# Patient Record
Sex: FEMALE | Race: WHITE | NOT HISPANIC OR LATINO
[De-identification: names, ages, dates, MRNs, and addresses within clinical notes are randomized per-mention and may not be internally consistent; named-entity substitution may affect disease eponyms.]

---

## 2023-06-30 PROBLEM — Z00.00 ENCOUNTER FOR PREVENTIVE HEALTH EXAMINATION: Status: ACTIVE | Noted: 2023-06-30

## 2023-07-06 ENCOUNTER — APPOINTMENT (OUTPATIENT)
Dept: ORTHOPEDIC SURGERY | Facility: CLINIC | Age: 58
End: 2023-07-06
Payer: COMMERCIAL

## 2023-07-06 VITALS — BODY MASS INDEX: 23.39 KG/M2 | WEIGHT: 137 LBS | HEIGHT: 64 IN

## 2023-07-06 DIAGNOSIS — Z78.9 OTHER SPECIFIED HEALTH STATUS: ICD-10-CM

## 2023-07-06 DIAGNOSIS — M19.90 UNSPECIFIED OSTEOARTHRITIS, UNSPECIFIED SITE: ICD-10-CM

## 2023-07-06 PROCEDURE — 72040 X-RAY EXAM NECK SPINE 2-3 VW: CPT

## 2023-07-06 PROCEDURE — 99204 OFFICE O/P NEW MOD 45 MIN: CPT

## 2023-07-06 PROCEDURE — 73030 X-RAY EXAM OF SHOULDER: CPT | Mod: RT

## 2023-07-06 PROCEDURE — 73010 X-RAY EXAM OF SHOULDER BLADE: CPT | Mod: RT

## 2023-07-07 NOTE — HISTORY OF PRESENT ILLNESS
[de-identified] : The patient is a 57 year  old right hand dominant female who presents today complaining of right shoulder pain.  \par Date of Injury/Onset: 6/1/23\par Pain:    At Rest: 4/10 \par With Activity:  10/10 \par Mechanism of injury: gradual onset, no HEATHER\par This is not a Work Related Injury being treated under Worker's Compensation.\par This is not an athletic injury occurring associated with an interscholastic or organized sports team.\par Quality of symptoms: tingling into right hand, dull throbbing, shooting pain from hands to shoulder, weakness\par Improves with: rest, icy hot\par Worse with: lifting, shoulder motions\par Prior treatment: none\par Prior Imaging: none\par Out of work/sport: currently working\par School/Sport/Position/Occupation: school counselor \par Additional Information: None\par

## 2023-07-07 NOTE — IMAGING
[Right] : right shoulder [There are no fractures, subluxations or dislocations. No significant abnormalities are seen] : There are no fractures, subluxations or dislocations. No significant abnormalities are seen [de-identified] : The patient is a well appearing 57 year old female of their stated age. \par Neck is supple & nontender to palpation. EQUIVOCAL Spurling's test. \par \par Right Shoulder: \par \par ROM: \par Forward Flexion: 180 degrees \par Abduction: 180 degrees \par ER at 90: 90 degrees \par IR at 90: 45 degrees \par ER at N: 50 degrees \par Motor: \par Abduction: 4- out of 5 \par FPS: 4- out of 5 \par Flexion: 4- out of 5 \par Internal Rotation: 4- out of 5 \par External Rotation: 4- out of 5 \par Provocative Testing: \par Impingement: Negative \par [place]'s: Negative \par Other: TENDER MEDIAL SCAPULA BORDER \par \par -----------------------------------\par Effected Elbow: RIGHT \par ROM: \par Flexion: 0-145 degrees \par Supination: 90 degrees \par Pronation: 90 degrees \par \par Inspection:\par Erythema: None\par Ecchymosis: None\par Abrasions: None\par Effusion: None\par Deformity: None\par \par Palpation:\par Crepitus: None\par Medial Epicondyle/Flexor-Pronator: Nontender\par Lateral Epicondyle/ECRB: TENDER \par Olecranon: Nontender\par Radial Head: Nontender\par Humeral Head: Nontender \par Distal Biceps: Nontender\par Distal Triceps: Nontender\par Flexor-Pronator: Nontender\par Extensor/ECRB: Nontender\par UCL: Nontender\par Pronator Intersection: Nontender\par Ulnar Nerve:  Stable & Nontender\par \par Stress Testing:\par Varus at 0 Degrees: Stable\par Varus at 30 Degrees: Stable\par Valgus at 0 Degrees: Stable\par Valgus at 30 Degrees: Stable\par \par Motor:\par Elbow Flexion: 5 out of 5\par Elbow Extension: 5 out of 5\par Supination: 5 out of 5\par Pronation: 5 out of 5\par Wrist Flexion: 5 out of 5\par Wrist Extension: 5 out of 5\par Interossei: 5 out of 5\par : 5 out of 5\par \par Provocative Testing:\par Milking: Negative\par Moving Valgus Stress: Negative\par Posterolateral R-I: Negative\par Hook Test: Negative\par Resisted Wrist Extension: WITH PAIN\par Resisted Index Finger Extension: WITH PAIN\par Resisted Middle Finger Extension: WITH PAIN \par Resisted Wrist Flexion: No Pain\par Resisted Pronation: No Pain\par \par Neurologic Exam:\par Axillary Nerve:  SLT\par Radial Nerve: SLT\par Median Nerve: SLT\par Ulnar Nerve:  SLT\par Other:  N/A\par Vascular Exam:\par Radial Pulse: 2+\par Ulnar Pulse: 2+\par Capillary Refill: <2 Seconds\par Other Exams: None\par Pertinent Contralateral Elbow Findings: None\par \par Assessment: The patient is a 57 year old woman with right elbow pain and paresthesia down RUE and radiographic and physical exam findings consistent with lateral epicondylitis and possible HNP.    \par The patient’s condition is acute\par Documents/Results Reviewed Today: X-Ray cervical spine, X-Ray right shoulder/scapula \par Tests/Studies Independently Interpreted Today: X-Ray cervical spine reveals evidence of straightening of normal cervical lordosis. X-Ray right shoulder/scapula is unremarkable. \par Pertinent findings include: Equivocal spurling producing radicular symptoms down RUE, 4-/5 strength throughout, -impingement, tender lateral epicondyle, pain with resisted wrist index and middle finger extension \par Confounding medical conditions/concerns: None\par \par Plan: Due to worsening weakness with associated radicular symptoms, the patient will obtain MRI cervical spine to evaluate for possible HNP. Start physical therapy, HEP, and stretching for lateral epicondylitis. Recommended the patient obtain a Reparel elbow sleeve to aid in inflammation. The patient will begin use of counter force strap for all activity - prescribed today. Discussed proper activity modifications to avoid aggravation of lateral epicondylitis. Modify activity as discussed. \par Tests Ordered: MRI cervical spine \par Prescription Medications Ordered: Discussed appropriate use of OTC anti-inflammatories and analgesics (including but not limited to Aleve, Advil, Tylenol, Motrin, Ibuprofen, Voltaren gel, etc.) \par Braces/DME Ordered: None\par Activity/Work/Sports Status: \par Additional Instructions: None\par Follow-Up: After MRI \par \par The patient's current medication management of their orthopedic diagnosis was reviewed today:\par (1) We discussed a comprehensive treatment plan that included possible pharmaceutical management involving the use of prescription strength medications including but not limited to options such as oral Naprosyn 500mg BID, once daily Meloxicam 15 mg, or 500-650 mg Tylenol versus over the counter oral medications and topical prescription NSAID Pennsaid vs over the counter Voltaren gel.  Based on our extensive discussion, the patient declined prescription medication and will use over the counter Advil, Alleve, Voltaren Gel or Tylenol as directed.\par (2) There is a moderate risk of morbidity with further treatment, especially from use of prescription strength medications and possible side effects of these medications which include upset stomach with oral medications, skin reactions to topical medications and cardiac/renal issues with long term use.\par (3) I recommended that the patient follow-up with their medical physician to discuss any significant specific potential issues with long term medication use such as interactions with current medications or with exacerbation of underlying medical comorbidities.\par (4) The benefits and risks associated with use of injectable, oral or topical, prescription and over the counter anti-inflammatory medications were discussed with the patient. The patient voiced understanding of the risks including but not limited to bleeding, stroke, kidney dysfunction, heart disease, and were referred to the black box warning label for further information. \par \par Kelley FINE attest that this documentation has been prepared under the direction and in the presence of Provider Dr. Ramses Crawford. \par \par The documentation recorded by the scribe accurately reflects the services IDr. Ramses, personally performed and the decisions made by me.\par \par   [FreeTextEntry1] : X-Ray right shoulder/scapula is unremarkable.

## 2023-07-13 ENCOUNTER — RESULT REVIEW (OUTPATIENT)
Age: 58
End: 2023-07-13

## 2023-07-20 ENCOUNTER — APPOINTMENT (OUTPATIENT)
Dept: ORTHOPEDIC SURGERY | Facility: CLINIC | Age: 58
End: 2023-07-20
Payer: COMMERCIAL

## 2023-07-20 VITALS — HEIGHT: 64 IN | WEIGHT: 137 LBS | BODY MASS INDEX: 23.39 KG/M2

## 2023-07-20 DIAGNOSIS — M25.511 PAIN IN RIGHT SHOULDER: ICD-10-CM

## 2023-07-20 DIAGNOSIS — M77.11 LATERAL EPICONDYLITIS, RIGHT ELBOW: ICD-10-CM

## 2023-07-20 PROCEDURE — 99214 OFFICE O/P EST MOD 30 MIN: CPT

## 2023-07-21 NOTE — DATA REVIEWED
[MRI] : MRI [Cervical Spine] : cervical spine [Report was reviewed and noted in the chart] : The report was reviewed and noted in the chart [I independently reviewed and interpreted images and report] : I independently reviewed and interpreted images and report [I reviewed the films/CD and additionally noted] : I reviewed the films/CD and additionally noted [FreeTextEntry1] : multilevel degenerative changes throughout the cervical spine, central disc herniation at C3-C4, spinal stenosis at C5-6, mild spinal stenosis at C6-C7, straightening of normal lordotic curve

## 2023-07-21 NOTE — IMAGING
[de-identified] : The patient is a well appearing 57 year old female of their stated age. \par Neck is supple & nontender to palpation. EQUIVOCAL Spurling's test. \par \par Right Shoulder: \par \par ROM: \par Forward Flexion: 180 degrees \par Abduction: 180 degrees \par ER at 90: 90 degrees \par IR at 90: 45 degrees \par ER at N: 50 degrees \par Motor: \par Abduction: 4- out of 5 \par FPS: 4- out of 5 \par Flexion: 4- out of 5 \par Internal Rotation: 4- out of 5 \par External Rotation: 4- out of 5 \par Provocative Testing: \par Impingement: Negative \par [place]'s: Negative \par Other: TENDER MEDIAL SCAPULA BORDER \par \par -----------------------------------\par Effected Elbow: RIGHT \par ROM: \par Flexion: 0-145 degrees \par Supination: 90 degrees \par Pronation: 90 degrees \par \par Inspection:\par Erythema: None\par Ecchymosis: None\par Abrasions: None\par Effusion: None\par Deformity: None\par \par Palpation:\par Crepitus: None\par Medial Epicondyle/Flexor-Pronator: Nontender\par Lateral Epicondyle/ECRB: TENDER \par Olecranon: Nontender\par Radial Head: Nontender\par Humeral Head: Nontender \par Distal Biceps: Nontender\par Distal Triceps: Nontender\par Flexor-Pronator: Nontender\par Extensor/ECRB: Nontender\par UCL: Nontender\par Pronator Intersection: Nontender\par Ulnar Nerve:  Stable & Nontender\par \par Stress Testing:\par Varus at 0 Degrees: Stable\par Varus at 30 Degrees: Stable\par Valgus at 0 Degrees: Stable\par Valgus at 30 Degrees: Stable\par \par Motor:\par Elbow Flexion: 5 out of 5\par Elbow Extension: 5 out of 5\par Supination: 5 out of 5\par Pronation: 5 out of 5\par Wrist Flexion: 5 out of 5\par Wrist Extension: 5 out of 5\par Interossei: 5 out of 5\par : 5 out of 5\par \par Provocative Testing:\par Milking: Negative\par Moving Valgus Stress: Negative\par Posterolateral R-I: Negative\par Hook Test: Negative\par Resisted Wrist Extension: WITH PAIN\par Resisted Index Finger Extension: WITH PAIN\par Resisted Middle Finger Extension: WITH PAIN \par Resisted Wrist Flexion: No Pain\par Resisted Pronation: No Pain\par \par Neurologic Exam:\par Axillary Nerve:  SLT\par Radial Nerve: SLT\par Median Nerve: SLT\par Ulnar Nerve:  SLT\par Other:  N/A\par Vascular Exam:\par Radial Pulse: 2+\par Ulnar Pulse: 2+\par Capillary Refill: <2 Seconds\par Other Exams: None\par Pertinent Contralateral Elbow Findings: None\par \par Assessment: The patient is a 57 year old woman with right elbow pain and paresthesia down RUE and radiographic and physical exam findings consistent with lateral epicondylitis and disc herniation.    \par The patient’s condition is acute\par Documents/Results Reviewed Today: MRI cervical spine \par Tests/Studies Independently Interpreted Today: MRI cervical spine reveals multilevel degenerative changes throughout the cervical spine, central disc herniation at C3-C4, spinal stenosis at C5-6, mild spinal stenosis at C6-C7, straightening of normal lordotic curve\par Pertinent findings include: Equivocal spurling producing radicular symptoms down RUE, 4-/5 strength throughout, -impingement, tender lateral epicondyle, pain with resisted wrist index and middle finger extension \par Confounding medical conditions/concerns: None\par \par Plan: Patient will start physical therapy, HEP, and stretching. Prescribed a Medrol Dose Pack to reduce pain and inflammation - use as directed. Referred patient to pain management specialist, Dr. Gan, for further evaluation and treatment. Modify activity as discussed. \par Tests Ordered: None\par Prescription Medications Ordered: MDP\par Braces/DME Ordered: None\par Activity/Work/Sports Status: activity modifications\par Additional Instructions: None\par Follow-Up: PRN\par \par The patient's current medication management of their orthopedic diagnosis was reviewed today:\par (1) We discussed a comprehensive treatment plan that included possible pharmaceutical management involving the use of prescription strength medications including but not limited to options such as oral Naprosyn 500mg BID, once daily Meloxicam 15 mg, or 500-650 mg Tylenol versus over the counter oral medications and topical prescription NSAID Pennsaid vs over the counter Voltaren gel.  Based on our extensive discussion, the patient was prescribed a Medrol Dose Pack to be taken as directed for the next five days.  Following which OTC NSAIDs may be used PRN for pain, inflammation, and discomfort.  No NSAID medications should be taken concurrently with the Medrol Dose Pack.\par (2) There is a moderate risk of morbidity with further treatment, especially from use of prescription strength medications and possible side effects of these medications which include upset stomach with oral medications, skin reactions to topical medications and cardiac/renal issues with long term use.\par (3) I recommended that the patient follow-up with their medical physician to discuss any significant specific potential issues with long term medication use such as interactions with current medications or with exacerbation of underlying medical comorbidities.\par (4) The benefits and risks associated with use of injectable, oral or topical, prescription and over the counter anti-inflammatory medications were discussed with the patient. The patient voiced understanding of the risks including but not limited to bleeding, stroke, kidney dysfunction, heart disease, and were referred to the black box warning label for further information.\par \par \par I, Tyesha Escalona, attest that this documentation has been prepared under the direction and in the presence of Provider Dr. Ramses Crawford.\par \par \par  The documentation recorded by the scribe accurately reflects the services I, Dr. Ramses Crawford, personally performed and the decisions made by me.\par

## 2023-07-21 NOTE — HISTORY OF PRESENT ILLNESS
[de-identified] : The patient is a 57 year  old right hand dominant female who presents today complaining of right shoulder pain.  \par Date of Injury/Onset: 6/1/23\par Pain:    At Rest: 4/10 \par With Activity:  10/10 \par Mechanism of injury: gradual onset, no HEATHER\par This is not a Work Related Injury being treated under Worker's Compensation.\par This is not an athletic injury occurring associated with an interscholastic or organized sports team.\par Quality of symptoms: tingling into right hand, dull throbbing, shooting pain from hands to shoulder, weakness\par Improves with: rest, icy hot\par Worse with: lifting, shoulder motions\par Treatment/Imaging/Studies Since Last Visit: MRI \par 	Reports Available For Review Today: MRI report\par Out of work/sport: currently working\par School/Sport/Position/Occupation: school counselor \par Changes since last visit: patient reports no change in pain/symptoms since last visit. Here to review MRI results\par Additional Information: None\par

## 2023-08-21 ENCOUNTER — APPOINTMENT (OUTPATIENT)
Dept: PAIN MANAGEMENT | Facility: CLINIC | Age: 58
End: 2023-08-21
Payer: COMMERCIAL

## 2023-08-21 VITALS — BODY MASS INDEX: 23.39 KG/M2 | HEIGHT: 64 IN | WEIGHT: 137 LBS

## 2023-08-21 PROCEDURE — 99204 OFFICE O/P NEW MOD 45 MIN: CPT

## 2023-08-21 RX ORDER — CELECOXIB 200 MG/1
200 CAPSULE ORAL
Qty: 30 | Refills: 1 | Status: ACTIVE | COMMUNITY
Start: 2023-08-21 | End: 1900-01-01

## 2023-08-21 NOTE — ASSESSMENT
[FreeTextEntry1] : A discussion regarding available pain management treatment options occurred with the patient.  These included interventional, rehabilitative, pharmacological, and alternative modalities. We will proceed with the following:    Interventional treatment options:   - proceed with C7-T1 MARTÍN  with fluoroscopic guidance - see additional instructions below    Rehabilitative options:   - initiate trial of physical therapy  - participation in active HEP was discussed    Medication based treatment options: - failed MDP - initiate trial of Celebrex 200 mg daily x 7-10 days then on as needed basis   - continue topical OTC analgesics on as-needed basis - see additional instructions below    Complementary treatment options:   - lifestyle modifications discussed      Additional treatment recommendations as follows:   - Follow up 1-2 weeks post injection for assessment of efficacy and further treatment recommendations  The risks, benefits and alternatives of the proposed procedure were explained in detail with the patient. The risks outlined include but are not limited to infection, bleeding, post- dural puncture headache, nerve injury, a temporary increase in pain, failure to resolve symptoms, allergic reaction, and possible elevation of blood sugar in diabetics if using corticosteroid.  All questions were answered to patient's apparent satisfaction and he/she verbalized an understanding.  We have discussed the risks, benefits, and alternatives NSAID therapy including but not limited to the risk of bleeding, thrombosis, gastric mucosal irritation/ulceration, allergic reaction and kidney dysfunction; the patient verbalizes an understanding.  The documentation recorded by the scribe, in my presence, accurately reflects the service I personally performed and the decisions made by me with my edits as appropriate.   I, Charles Omalley acting as scribe, attest that this documentation has been prepared under the direction and in the presence of Provider Jona Gan DO.

## 2023-08-21 NOTE — REASON FOR VISIT
[Initial Consultation] : an initial pain management consultation [FreeTextEntry2] :  neck/bilateral shoulder pain

## 2023-08-21 NOTE — PHYSICAL EXAM
[de-identified] : Constitutional:   - No acute distress   - Well developed; well nourished    Neurological:   - normal mood and affect   - alert and oriented x 3     Cardiovascular:   - grossly normal   Cervical Spine Exam:   Inspection:   erythema (-)   ecchymosis (-)   rashes (-)    Palpation:                                                    Cervical paraspinal tenderness:         R (-); L (-)  Upper trapezius tenderness:              R (+); L (+)  Rhomboids tenderness:                      R (-); L (-)  Occipital Ridge:                                   R (-); L (-)  Supraspinatus tenderness:                 R (-); L (-)   ROM: WNL  pain with extremes of bilateral rotation  Strength Testing:              Deltoid                           R (5/5); L (5/5)  Biceps:                          R (5/5); L (5/5)  Triceps:                         R (5/5); L (5/5)  Finger Abductors:         R (5/5); L (5/5)  Grasp:                           R (5/5); L (5/5)   Special Testing:  Spurling Test:                  R (EQ); L (+)  Facet load test:               R (-); L (-)   Neuro:  SILT throughout right upper extremity  SILT throughout left upper extremity   Reflexes:  Biceps   -           R (2+); L (2+)  Triceps  -           R (2+); L (2+)  Brachioradialis- R (2+); L (2+)     No ankle clonus

## 2023-08-21 NOTE — HISTORY OF PRESENT ILLNESS
[Neck] : neck [Left Arm] : left arm [Right Arm] : right arm [Sudden] : sudden [6] : 6 [5] : 5 [Tingling] : tingling [Intermittent] : intermittent [FreeTextEntry1] : The patient presents for initial evaluation regarding their neck/bilateral upper extremity pain.  Patient was referred by Dr. Crawford. Patient complains of bilateral arm pain starting from the shoulders down to the elbow, with intermittent radiation down past the elbow into the forearm; she also experiences paresthesias bilaterally in the hands. She has subjective weakness in her hands, she has trouble gripping and lifting items. Patient uses a TENS machine at home and does an HEP, but has not done formal PT.  Subjective Weakness: Yes  Numbness/Tingling: Yes  Bladder/Bowel dysfunction: No  Gait Abnormalities: No  Fine motor coordination changes: No   Injections: No    Pertinent Surgical History: N/A   Imagin) MRI Cervical Spine (2023) - [facility]  C2-3: There is no disc herniation, significant central canal or neural foraminal stenosis. C3-4: There is a small central disc herniation impinging upon the thecal sac without cord. There is no significant central canal or foraminal stenosis. C4-5: There is mild anterior osteophyte. There is no disc herniation, significant central canal or neural foraminal stenosis. C5-6: There is loss of disc space height, disc desiccation and mild ventral as well as bilateral disc/ridge complex impinging upon the thecal sac. There is mild spinal stenosis and mild bilateral foraminal encroachment. C6-7: There is marked loss of disc space height with mild ventral and bilateral disc/ridge complex. There is no cord compression. There is mild spinal stenosis. C7-T1: There is no disc herniation, significant central canal or neural foraminal stenosis. No gross cord signal abnormality seen.  Physician Disclaimer: I have personally reviewed and confirmed all HPI data with the patient.  [] : no [FreeTextEntry7] : arms [FreeTextEntry9] : HEP [de-identified] : CERVICAL MRI AT [facility]

## 2023-08-24 ENCOUNTER — TRANSCRIPTION ENCOUNTER (OUTPATIENT)
Age: 58
End: 2023-08-24

## 2023-11-06 ENCOUNTER — APPOINTMENT (OUTPATIENT)
Dept: PAIN MANAGEMENT | Facility: CLINIC | Age: 58
End: 2023-11-06
Payer: COMMERCIAL

## 2023-11-06 VITALS — HEIGHT: 64 IN | WEIGHT: 138 LBS | BODY MASS INDEX: 23.56 KG/M2

## 2023-11-06 DIAGNOSIS — M54.10 RADICULOPATHY, SITE UNSPECIFIED: ICD-10-CM

## 2023-11-06 PROCEDURE — J3490M: CUSTOM

## 2023-11-06 PROCEDURE — 20553 NJX 1/MLT TRIGGER POINTS 3/>: CPT

## 2023-11-06 PROCEDURE — 99213 OFFICE O/P EST LOW 20 MIN: CPT | Mod: 25

## 2023-12-07 ENCOUNTER — APPOINTMENT (OUTPATIENT)
Dept: PAIN MANAGEMENT | Facility: CLINIC | Age: 58
End: 2023-12-07
Payer: COMMERCIAL

## 2023-12-07 VITALS — HEIGHT: 64 IN | BODY MASS INDEX: 23.73 KG/M2 | WEIGHT: 139 LBS

## 2023-12-07 DIAGNOSIS — M54.12 RADICULOPATHY, CERVICAL REGION: ICD-10-CM

## 2023-12-07 PROCEDURE — 99214 OFFICE O/P EST MOD 30 MIN: CPT

## 2023-12-07 RX ORDER — METHYLPREDNISOLONE 4 MG/1
4 TABLET ORAL
Qty: 1 | Refills: 0 | Status: DISCONTINUED | COMMUNITY
Start: 2023-07-20 | End: 2023-12-07

## 2023-12-15 ENCOUNTER — APPOINTMENT (OUTPATIENT)
Dept: PAIN MANAGEMENT | Facility: CLINIC | Age: 58
End: 2023-12-15
Payer: COMMERCIAL

## 2023-12-15 PROCEDURE — 62321 NJX INTERLAMINAR CRV/THRC: CPT

## 2023-12-15 NOTE — PROCEDURE
[FreeTextEntry3] : Date of Service: 12/15/2023   Account: [id number]  Patient: VERNELL NICOLAS   YOB: 1965  Age: 58 year  Surgeon:      Jona Gan DO  Assistant:    None  Pre-Operative Diagnosis:         Cervical Radiculopathy (M54.12)  Post Operative Diagnosis:       Cervical Radiculopathy (M54.12)  Procedure:             Cervical (C7-T1) interlaminar epidural steroid injection under fluoroscopic guidance  Anesthesia:  MAC  This procedure was carried out using fluoroscopic guidance.  The risks and benefits of the procedure were discussed extensively with the patient.  The consent of the patient was obtained and the following procedure was performed.   A timeout was performed with all essential staff present and the site and side were verified.  The patient was placed in the prone position and optimized to patient comfort.  The cervical area was prepped and draped in a sterile fashion.  The fluoroscope visualized the C7-T1 interspace using slight cephalad-caudad angulation and this area was marked.  Using sterile technique the superficial skin was anesthetized with 1% Lidocaine.  A 20 gauge 3.5 inch Tuohy needle was advanced under fluoroscopy until ligament was engaged.  Using a contralateral oblique view, a "loss of resistance" to air technique was utilized in order to gain access to the epidural space.  After negative aspiration for heme and CSF, 1 cc of Omnipaque contrast was administered and the appropriate cervical epidurogram was obtained in the FRANTZ and A/P view as well as digital subtraction angiography.  A total injectate of 3 cc of preservative free normal saline and 40 mg of Kenalog was then injected into the epidural space while maintaining meaningful verbal contact with the patient.    Vital signs remained normal throughout the procedure.  The patient tolerated the procedure well.  There were no immediate complications from the performed procedure.  The patient was instructed to apply ice over the injection sites for twenty minutes every two hours for the next 24 hours.  Disposition:      1. The patient was advised to F/U in 1-2 weeks to assess the response to the injection.      2. The patient was also instructed to contact me immediately if there were any concerns related to the procedure performed.

## 2024-01-29 ENCOUNTER — APPOINTMENT (OUTPATIENT)
Dept: PAIN MANAGEMENT | Facility: CLINIC | Age: 59
End: 2024-01-29
Payer: COMMERCIAL

## 2024-01-29 VITALS — WEIGHT: 140 LBS | HEIGHT: 64 IN | BODY MASS INDEX: 23.9 KG/M2

## 2024-01-29 DIAGNOSIS — M47.22 OTHER SPONDYLOSIS WITH RADICULOPATHY, CERVICAL REGION: ICD-10-CM

## 2024-01-29 DIAGNOSIS — M50.10 CERVICAL DISC DISORDER WITH RADICULOPATHY, UNSPECIFIED CERVICAL REGION: ICD-10-CM

## 2024-01-29 PROCEDURE — 99214 OFFICE O/P EST MOD 30 MIN: CPT

## 2024-01-29 NOTE — HISTORY OF PRESENT ILLNESS
[Neck] : neck [Left Arm] : left arm [Right Arm] : right arm [Sudden] : sudden [6] : 6 [5] : 5 [Tingling] : tingling [Intermittent] : intermittent [FreeTextEntry1] : 2024 - Patient presents for follow up after C7-T1 MARTÍN on 12/15/2023. Patient reports a complete resolution of her pain and paresthesias for about 3 weeks, before she exacerbated her pain playing tug of war with a small dog. The next day her paresthesias started to return, and get progressively worse, she now has pain radiating bilaterally down the posterior arms.  Symptoms have not returned to the point they were at at symptom onset however she is fearful of this.  2023 - Patient presents for FUV regarding their neck pain. Patient reports some benefit s/p TPI at the previous visit, she has pain in the neck with radiation bilaterally to the upper extremities.  Patient states that overall her symptoms have improved to some degree since their onset in the summer however still with persistent pain and fatigue of the upper extremities.  Failed meloxicam.  2023- Patient presents for FUV regarding their neck pain.  Patient to the office for re-evaluation.  She completed 10 PT sessions with improved UE weakness but not much effect on her pain.  The majority of the pain if right sided neck tightness and decreased ROM.  The pain also radiates down her RUE>LUE.  She takes Mobic for pain relief,    2023- The patient presents for initial evaluation regarding their neck/bilateral upper extremity pain.  Patient was referred by Dr. Crawford. Patient complains of bilateral arm pain starting from the shoulders down to the elbow, with intermittent radiation down past the elbow into the forearm; she also experiences paresthesias bilaterally in the hands. She has subjective weakness in her hands, she has trouble gripping and lifting items. Patient uses a TENS machine at home and does an HEP, but has not done formal PT.  Injections:  1) C7-T1 MARTÍN (12/15/2023)  Pertinent Surgical History: N/A   Imagin) MRI Cervical Spine (2023) - [facility] C2-3: There is no disc herniation, significant central canal or neural foraminal stenosis. C3-4: There is a small central disc herniation impinging upon the thecal sac without cord. There is no significant central canal or foraminal stenosis. C4-5: There is mild anterior osteophyte. There is no disc herniation, significant central canal or neural foraminal stenosis. C5-6: There is loss of disc space height, disc desiccation and mild ventral as well as bilateral disc/ridge complex impinging upon the thecal sac. There is mild spinal stenosis and mild bilateral foraminal encroachment. C6-7: There is marked loss of disc space height with mild ventral and bilateral disc/ridge complex. There is no cord compression. There is mild spinal stenosis. C7-T1: There is no disc herniation, significant central canal or neural foraminal stenosis. No gross cord signal abnormality seen.  Physician Disclaimer: I have personally reviewed and confirmed all HPI data with the patient.  [] : no [FreeTextEntry7] : arms [FreeTextEntry9] : HEP [de-identified] : CERVICAL MRI AT [facility]

## 2024-01-29 NOTE — PHYSICAL EXAM
[de-identified] : Constitutional:   - No acute distress   - Well developed; well nourished    Neurological:   - normal mood and affect   - alert and oriented x 3     Cardiovascular:   - grossly normal   Cervical Spine Exam:   Inspection:   erythema (-)   ecchymosis (-)   rashes (-)    Palpation:                                                    Cervical paraspinal tenderness:         R (-); L (-)  Upper trapezius tenderness:              R (+); L (+)  Rhomboids tenderness:                      R (-); L (-)  Occipital Ridge:                                   R (-); L (-)  Supraspinatus tenderness:                 R (-); L (-)   ROM: WNL  pain with extremes of bilateral rotation  Strength Testing:              Deltoid                           R (5/5); L (5/5)  Biceps:                          R (5/5); L (5/5)  Triceps:                         R (5/5); L (5/5)  Finger Abductors:         R (5/5); L (5/5)  Grasp:                           R (5/5); L (5/5)   Special Testing:  Spurling Test:                  R (eq); L (Eq)  Facet load test:               R (-); L (-)   Neuro:  SILT throughout right upper extremity  SILT throughout left upper extremity   Reflexes:  Biceps   -           R (2+); L (2+)  Triceps  -           R (2+); L (2+)  Brachioradialis- R (2+); L (2+)     No ankle clonus

## 2024-01-29 NOTE — ASSESSMENT
[FreeTextEntry1] : A discussion regarding available pain management treatment options occurred with the patient. These included interventional, rehabilitative, pharmacological, and alternative modalities. We will proceed with the following:  Interventional treatment options: - Would proceed with repeat C7-T1 MARTÍN (80 mg Kenalog) with increasing neck/radicular pain-will call - Once again explained diagnostic and potentially therapeutic role for the indicated procedure - Can repeat TPI for refractory cervical myofascial pain - see additional instructions below  Rehabilitative options: - Completed prior physical therapy trials - participation in active HEP was discussed and encouraged as tolerated  Medication based treatment options: - Continue OTC NSAIDs on as-needed basis - continue topical OTC analgesics on as-needed basis - Patient prefers to avoid medication based therapies - see additional instructions below  Complementary treatment options: - lifestyle modifications discussed  Additional treatment recommendations as follows: - Obtain EMG/NCV; rule out peripheral nerve entrapment - Follow up 1-2 weeks post injection for assessment of efficacy and further treatment recommendations  The risks, benefits and alternatives of the proposed procedure were explained in detail with the patient. The risks outlined include but are not limited to infection, bleeding, post- dural puncture headache, nerve injury, a temporary increase in pain, failure to resolve symptoms, allergic reaction, and possible elevation of blood sugar in diabetics if using corticosteroid. All questions were answered to patient's apparent satisfaction and he/she verbalized an understanding.  We have discussed the risks, benefits, and alternatives NSAID therapy including but not limited to the risk of bleeding, thrombosis, gastric mucosal irritation/ulceration, allergic reaction and kidney dysfunction; the patient verbalizes an understanding.  The documentation recorded by the scribe, in my presence, accurately reflects the service I personally performed and the decisions made by me with my edits as appropriate.  I, Charles Omalley acting as scribe, attest that this documentation has been prepared under the direction and in the presence of Provider Jona Gan DO.

## 2024-01-29 NOTE — REASON FOR VISIT
[Follow-Up Visit] : a follow-up pain management visit [FreeTextEntry2] : Neck/bilateral upper extremity pain

## 2024-02-20 ENCOUNTER — APPOINTMENT (OUTPATIENT)
Dept: NEUROLOGY | Facility: CLINIC | Age: 59
End: 2024-02-20
Payer: COMMERCIAL

## 2024-02-20 PROCEDURE — 95912 NRV CNDJ TEST 11-12 STUDIES: CPT

## 2024-02-20 PROCEDURE — 95886 MUSC TEST DONE W/N TEST COMP: CPT

## 2024-04-15 ENCOUNTER — APPOINTMENT (OUTPATIENT)
Dept: PAIN MANAGEMENT | Facility: CLINIC | Age: 59
End: 2024-04-15
Payer: COMMERCIAL

## 2024-04-15 VITALS — WEIGHT: 143 LBS | HEIGHT: 64 IN | BODY MASS INDEX: 24.41 KG/M2

## 2024-04-15 DIAGNOSIS — M54.12 RADICULOPATHY, CERVICAL REGION: ICD-10-CM

## 2024-04-15 DIAGNOSIS — M79.18 MYALGIA, OTHER SITE: ICD-10-CM

## 2024-04-15 PROCEDURE — 99213 OFFICE O/P EST LOW 20 MIN: CPT

## 2024-04-15 NOTE — REASON FOR VISIT
[Follow-Up Visit] : a follow-up pain management visit [FreeTextEntry2] : bilateral upper extremity pain

## 2024-04-15 NOTE — ASSESSMENT
[FreeTextEntry1] : A discussion regarding available pain management treatment options occurred with the patient. These included interventional, rehabilitative, pharmacological, and alternative modalities. We will proceed with the following:  Discussed multifactorial nature of her upper extremity symptomatology.  At present times symptoms seem more consistent with CTS than cervical radiculopathy.  This is concordant with EMG/NCV findings.  Interventional treatment options: - None indicated at present time - Will consider repeat C7-T1 MARTÍN (80 mg Kenalog) with increasing neck/radicular pain - Once again explained diagnostic and potentially therapeutic role for the indicated procedure - Can repeat TPI for refractory cervical myofascial pain - see additional instructions below  Rehabilitative options: - Completed prior physical therapy trials - participation in active HEP was discussed and encouraged as tolerated  Medication based treatment options: - Continue OTC NSAIDs on as-needed basis - continue topical OTC analgesics on as-needed basis - Patient prefers to avoid medication based therapies - see additional instructions below  Complementary treatment options: - lifestyle modifications discussed - Advised use of response  Additional treatment recommendations as follows: - Referral provided for Dr. Godinez - Patient will follow-up on as-needed basis  We have discussed the risks, benefits, and alternatives NSAID therapy including but not limited to the risk of bleeding, thrombosis, gastric mucosal irritation/ulceration, allergic reaction and kidney dysfunction; the patient verbalizes an understanding.  The documentation recorded by the scribe, in my presence, accurately reflects the service I personally performed and the decisions made by me with my edits as appropriate.  I, Charles Omalley acting as scribe, attest that this documentation has been prepared under the direction and in the presence of Provider Jona Gan DO.

## 2024-04-15 NOTE — PHYSICAL EXAM
[de-identified] : Constitutional:   - No acute distress   - Well developed; well nourished    Neurological:   - normal mood and affect   - alert and oriented x 3     Cardiovascular:   - grossly normal   Cervical Spine Exam:   Inspection:   erythema (-)   ecchymosis (-)   rashes (-)    Palpation:                                                    Cervical paraspinal tenderness:         R (-); L (-)  Upper trapezius tenderness:              R (-); L (-)  Rhomboids tenderness:                      R (-); L (-)  Occipital Ridge:                                   R (-); L (-)  Supraspinatus tenderness:                 R (-); L (-)   ROM: WNL  pain with extremes of bilateral rotation  Strength Testing:              Deltoid                           R (5/5); L (5/5)  Biceps:                          R (5/5); L (5/5)  Triceps:                         R (5/5); L (5/5)  Finger Abductors:         R (5/5); L (5/5)  Grasp:                           R (5/5); L (5/5)   Special Testing:  Spurling Test:                  R (-); L (-)  Facet load test:               R (-); L (-)   Neuro:  SILT throughout right upper extremity  SILT throughout left upper extremity   Reflexes:  Biceps   -           R (2+); L (2+)  Triceps  -           R (2+); L (2+)  Brachioradialis- R (2+); L (2+)     No ankle clonus

## 2024-04-15 NOTE — HISTORY OF PRESENT ILLNESS
[Neck] : neck [Left Arm] : left arm [Right Arm] : right arm [Sudden] : sudden [6] : 6 [5] : 5 [Tingling] : tingling [Intermittent] : intermittent [FreeTextEntry1] : 4/15/2024 - Patient presents for FUV regarding their neck pain. Patient had an EMG done on 2024 which she would like to review.  Her pain complain is the bilateral hand and arm pain/paresthesias, worse in the morning or in a position with bent elbows for a prolonged period of time.  She denies any significant neck pain per se.  2024 - Patient presents for follow up after C7-T1 MARTÍN on 12/15/2023. Patient reports a complete resolution of her pain and paresthesias for about 3 weeks, before she exacerbated her pain playing tug of war with a small dog. The next day her paresthesias started to return, and get progressively worse, she now has pain radiating bilaterally down the posterior arms.  Symptoms have not returned to the point they were at at symptom onset however she is fearful of this.  2023 - Patient presents for FUV regarding their neck pain. Patient reports some benefit s/p TPI at the previous visit, she has pain in the neck with radiation bilaterally to the upper extremities.  Patient states that overall her symptoms have improved to some degree since their onset in the summer however still with persistent pain and fatigue of the upper extremities.  Failed meloxicam.  2023- Patient presents for FUV regarding their neck pain.  Patient to the office for re-evaluation.  She completed 10 PT sessions with improved UE weakness but not much effect on her pain.  The majority of the pain if right sided neck tightness and decreased ROM.  The pain also radiates down her RUE>LUE.  She takes Mobic for pain relief,    2023- The patient presents for initial evaluation regarding their neck/bilateral upper extremity pain.  Patient was referred by Dr. Crawford. Patient complains of bilateral arm pain starting from the shoulders down to the elbow, with intermittent radiation down past the elbow into the forearm; she also experiences paresthesias bilaterally in the hands. She has subjective weakness in her hands, she has trouble gripping and lifting items. Patient uses a TENS machine at home and does an HEP, but has not done formal PT.  Injections:  1) C7-T1 MARÍTN (12/15/2023)  Pertinent Surgical History: N/A   Imagin) MRI Cervical Spine (2023) - [facility] C2-3: There is no disc herniation, significant central canal or neural foraminal stenosis. C3-4: There is a small central disc herniation impinging upon the thecal sac without cord. There is no significant central canal or foraminal stenosis. C4-5: There is mild anterior osteophyte. There is no disc herniation, significant central canal or neural foraminal stenosis. C5-6: There is loss of disc space height, disc desiccation and mild ventral as well as bilateral disc/ridge complex impinging upon the thecal sac. There is mild spinal stenosis and mild bilateral foraminal encroachment. C6-7: There is marked loss of disc space height with mild ventral and bilateral disc/ridge complex. There is no cord compression. There is mild spinal stenosis. C7-T1: There is no disc herniation, significant central canal or neural foraminal stenosis. No gross cord signal abnormality seen.  Electrodiagnostic Studies: EMG/NCV- 2024 ([facility]) Impression: Bilateral demyelinating median nerve neuropathy, mild bilateral carpal tunnel syndrome  Physician Disclaimer: I have personally reviewed and confirmed all HPI data with the patient.  [] : no [FreeTextEntry7] : arms [FreeTextEntry9] : HEP [de-identified] : CERVICAL MRI AT [facility]

## 2024-04-29 ENCOUNTER — APPOINTMENT (OUTPATIENT)
Dept: ORTHOPEDIC SURGERY | Facility: CLINIC | Age: 59
End: 2024-04-29
Payer: COMMERCIAL

## 2024-04-29 VITALS — WEIGHT: 130 LBS | HEIGHT: 64 IN | BODY MASS INDEX: 22.2 KG/M2

## 2024-04-29 DIAGNOSIS — Z87.891 PERSONAL HISTORY OF NICOTINE DEPENDENCE: ICD-10-CM

## 2024-04-29 DIAGNOSIS — G56.03 CARPAL TUNNEL SYNDROM,BILATERAL UPPER LIMBS: ICD-10-CM

## 2024-04-29 PROCEDURE — 99203 OFFICE O/P NEW LOW 30 MIN: CPT

## 2024-04-30 PROBLEM — G56.03 BILATERAL CARPAL TUNNEL SYNDROME: Status: ACTIVE | Noted: 2024-04-15

## 2024-04-30 NOTE — ASSESSMENT
[FreeTextEntry1] : Bilateral CTS - reviewed pathoanatomy. Encouraged nighttime cockup wrist bracing. Reviewed bilateral UE EMG/NCV of bilateral mild CTS. Will manage nonoperatively at this time.  F/u 3-4mo/prn

## 2024-04-30 NOTE — IMAGING
[de-identified] : Right wrist with no swelling nor erythema. Able to make fist, oppose thumb to small finger and abduct fingers, no overt atrophy. +Phalen's, +tinel at carpal, -tinel at Guyon, -tinel at cubital. -froment, -seleneberg. Intact sensation in median and intact at superficial radial and intact in small and ulnar ring finger(normal at ulnar hand) prior to provocative testing. <2sec cap refill.   Left wrist with no swelling nor erythema. Able to make fist, oppose thumb to small finger and abduct fingers, no overt atrophy. +Phalen's, +tinel at carpal, -tinel at Guyon, -tinel at cubital. -froment, -seleneberg. Intact sensation in median and intact at superficial radial and intact in small and ulnar ring finger(normal at ulnar hand) prior to provocative testing. <2sec cap refill.

## 2024-04-30 NOTE — HISTORY OF PRESENT ILLNESS
[de-identified] : Age: 58 year F PMHx: Cervical radiculopathy, bilateral CTS, Arthritis Hand Dominance: RHD Chief Complaint: Bilateral arm pain onset Summer 2023 with NKI. Patient reports that her symptoms onset with no precipitating factors. Patient states that she is having pain from her bilateral elbows that radiates down throughout her forearm. Patient was seen by Dr. Gan for her pain and was told it may be secondary to a neck issue. Patient states that she had an EMG performed that showed mild CTS. Patient was referred to [facility] for further evaluation. Reports numbness/tingling in her forearms that goes into her bilateral hands. Aggravated with fine motor movements.  Trauma: NKI Outside Imaging/Treatment: EMG 02/20/24, showed mild CTS OTC Medications: OT/PT: none Bracing: none Pain worse with: exertion, fine motor movements  Pain better with: rest